# Patient Record
Sex: MALE | Race: WHITE | HISPANIC OR LATINO | URBAN - METROPOLITAN AREA
[De-identification: names, ages, dates, MRNs, and addresses within clinical notes are randomized per-mention and may not be internally consistent; named-entity substitution may affect disease eponyms.]

---

## 2018-04-18 NOTE — PATIENT DISCUSSION
about two weeks ago had a few episodes of 10-15 mintues in one hour (three episodes).  still feels like has something out temporally on left eye.  no headache or other FNS with these episoes.

## 2018-04-18 NOTE — PATIENT DISCUSSION
glasses Rx to see if helps D and NVA with correction.  IF so can consider LVC enh OU to get to personal BCVA OU with ReSTOR IOLs.

## 2018-05-31 NOTE — PATIENT DISCUSSION
about six weeks ago had a few episodes of 10-15 mintues in one hour (three episodes).  still feels like has something out temporally on left eye.  no headache or other FNS with these episoes.

## 2018-05-31 NOTE — PATIENT DISCUSSION
temporally OS (subjectvie).   VF today shows no VF loss.  He has had a Hx of ocular migraines since age 17.  What he is seeing is likely just that.  Has had no episodes in the last 30 days.  Seeing a neurologist in June 2018.  See #2.

## 2018-09-28 ENCOUNTER — IMPORTED ENCOUNTER (OUTPATIENT)
Dept: URBAN - METROPOLITAN AREA CLINIC 31 | Facility: CLINIC | Age: 33
End: 2018-09-28

## 2018-09-28 PROCEDURE — 92004 COMPRE OPH EXAM NEW PT 1/>: CPT

## 2018-09-28 PROCEDURE — 92310 CONTACT LENS FITTING OU: CPT

## 2018-09-28 NOTE — PATIENT DISCUSSION
1.  Refractive error - Glasses change optional. Continue present contact lens modality. Stop/wear and call if any redness pain or decrease in vision occur. 2.  Return for an appointment for comprehensive exam. with Dr. Roly Pearson.

## 2020-09-14 ENCOUNTER — IMPORTED ENCOUNTER (OUTPATIENT)
Dept: URBAN - METROPOLITAN AREA CLINIC 31 | Facility: CLINIC | Age: 35
End: 2020-09-14

## 2020-09-14 PROCEDURE — 92310 CONTACT LENS FITTING OU: CPT

## 2020-09-14 PROCEDURE — 92014 COMPRE OPH EXAM EST PT 1/>: CPT

## 2020-09-14 NOTE — PATIENT DISCUSSION
1.  Refractive error - Continue present contact lens modalitiy but order Clariti trials to try before ordering. If prefers can switch. . Pt can collcet trials. Stop/wear and call if any redness pain or decrease in vision occur. Glasses change optional. 2.  Return for an appointment in 1 year for comprehensive exam. with Dr. Yohannes Sharpe.

## 2021-11-01 ENCOUNTER — IMPORTED ENCOUNTER (OUTPATIENT)
Dept: URBAN - METROPOLITAN AREA CLINIC 31 | Facility: CLINIC | Age: 36
End: 2021-11-01

## 2021-11-01 PROCEDURE — 92014 COMPRE OPH EXAM EST PT 1/>: CPT

## 2021-11-01 PROCEDURE — 92310 CONTACT LENS FITTING OU: CPT

## 2021-11-01 NOTE — PATIENT DISCUSSION
1.  Refractive error - Glasses change optional. Continue present contact lens modality. Stop/wear and call if any redness pain or decrease in vision occur. 2.  Return for an appointment for comprehensive exam. with Dr. Cassie Freedman.

## 2022-04-02 ASSESSMENT — VISUAL ACUITY
OS_SC: 20/20
OD_SC: 20/20
OS_SC: 20/20+2
OD_SC: 20/20
OS_SC: 20/20
OD_SC: 20/20+1

## 2022-04-02 ASSESSMENT — TONOMETRY
OD_IOP_MMHG: 15
OS_IOP_MMHG: 18
OD_IOP_MMHG: 16
OD_IOP_MMHG: 21
OS_IOP_MMHG: 15
OS_IOP_MMHG: 14

## 2022-06-09 NOTE — PATIENT DISCUSSION
No Retinal holes, Tears or Detachments. Display Individual Monthly Dosage In The Note (If Yes Will Display All Dosages Which Are Not N/A): yes

## 2023-01-23 ENCOUNTER — ESTABLISHED PATIENT (OUTPATIENT)
Dept: URBAN - METROPOLITAN AREA CLINIC 29 | Facility: CLINIC | Age: 38
End: 2023-01-23

## 2023-01-23 DIAGNOSIS — H52.13: ICD-10-CM

## 2023-01-23 DIAGNOSIS — H52.203: ICD-10-CM

## 2023-01-23 PROCEDURE — 92015 DETERMINE REFRACTIVE STATE: CPT

## 2023-01-23 PROCEDURE — 92310-2 LEVEL 2 CONTACT LENS MANAGEMENT

## 2023-01-23 PROCEDURE — 92014 COMPRE OPH EXAM EST PT 1/>: CPT

## 2023-01-23 ASSESSMENT — VISUAL ACUITY
OS_SC: 20/20
OD_CC: 20/20
OS_CC: 20/20-2
OD_SC: 20/20

## 2023-01-23 ASSESSMENT — TONOMETRY
OD_IOP_MMHG: 13
OS_IOP_MMHG: 13